# Patient Record
Sex: FEMALE | Race: WHITE
[De-identification: names, ages, dates, MRNs, and addresses within clinical notes are randomized per-mention and may not be internally consistent; named-entity substitution may affect disease eponyms.]

---

## 2017-05-03 NOTE — QN
Documentation


Comment


31-year-old  with IUP at 39 weeks and 1 day with history of  1 

and unstable lie, she is here today for NST due to GDM A1.  Past GYN history 

significant for history of bicornuate uterus.  She also had a history of C-

section 1 due to breech presentation.  Baby's presentation and current 

pregnancy is variable and had unstable lie.  Last ultrasound confirmed vertex 

presentation.  Patient denies any leaking of fluid, vaginal bleeding, decreased 

fetal movement, contractions or any other complaint.  Patient reports that she 

has been compliant with taking her blood sugar.  She shows me her blood sugar 

log.  After review noted fasting blood sugar and postprandial in goal range.  

She checks fasting and 2 hour postprandial.





Physical examination: General appearance: Alert and oriented 4 patient does 

not appear to be in any acute distress.





Abdomen: Soft, gravid, nontender,


Fundal height consistent with gestational age.


NST: Category 1, there was a single episode of early deceleration with a 

variable deceleration 1 that resolved and did not recur during monitoring


BPP: 





PROCEDURE:   OB ultrasound for biophysical profile 


 


CLINICAL INDICATION:   Presentation


 


TECHNIQUE:   Multiple sonographic images of the pelvis were obtained.  

Transabdominal views of the gravid uterus are available for review.  The images 

were reviewed on a PACS workstation.  


 


COMPARISON:   None 


 


FINDINGS:


 


Fetal breathing movement = 2/2


Fetal tone = 2/2


Fetal motion = 2/2


FRANCISCO = 2/2


 


FRANCISCO = 8.4 cm


Single live intrauterine pregnancy.  Fetal position is cephalic.  The placenta 

is anterior.


 


IMPRESSION:


 


1.  Single live intrauterine gestation.  


2.  Biophysical profile = /8.


3.  FRANCISCO = 8.4 cm. 


 








Assessment:


IUP at 39 weeks and 1 day


History of  1


GDM A1, well controlled


History of  1 due to breech, desires T0 LAC


Bicornuate uterus


Baby's presentation currently vertex  testing reassuring.


Patient will be discharged home.





Will return to anesthetic clinic in 2 days for repeat NST


Strict labor precaution and fetal kick count discussed


She is currently scheduled for  at 40 weeks if she does not deliver by 

then


She verbalized understanding plan of care and all above instructions.











IVAN MITCHELL MD May 3, 2017 16:57

## 2017-05-03 NOTE — RADRPT
PROCEDURE:   OB ultrasound for biophysical profile 

 

CLINICAL INDICATION:   Presentation

 

TECHNIQUE:   Multiple sonographic images of the pelvis were obtained.  Transabdominal views of the g
ravid uterus are available for review.  The images were reviewed on a PACS workstation.  

 

COMPARISON:   None 

 

FINDINGS:

 

Fetal breathing movement = 2/2

Fetal tone = 2/2

Fetal motion = 2/2

FRANCISCO = 2/2

 

FRANCISCO = 8.4 cm

Single live intrauterine pregnancy.  Fetal position is cephalic.  The placenta is anterior.

 

IMPRESSION:

 

1.  Single live intrauterine gestation.  

2.  Biophysical profile = 8/8.

3.  FRANCISCO = 8.4 cm. 

 

 

RPTAT: HH

_____________________________________________ 

.Blanca Whittaker MD, MD           Date    Time 

Electronically viewed and signed by .Blanca Whittaker MD, MD on 05/03/2017 12:10 

 

D:  05/03/2017 12:10  T:  05/03/2017 12:10

.G/

## 2017-05-08 NOTE — OPR
Operative Report


Planned Procedure


Procedure date


May 8, 2017


Procedure(s)


repeat C/S


Performed by:  DALTON CLIFTON MD


Assisting provider:  SANDRITA CHAN MD


Anesthesiologist:  JAEL LEMUS MD


Pre-procedure diagnosis


term gestation


previous C/S 


labor pains








Anesthesia Type:   spinal











Procedure Description


Under satisfactory anaesthesia a Pfannenstiel incision was made two 

fingerbreadth above and parallel to the symphysis of pubis around the previous 

scar and previous scar was removed 


Incision was extended laterally to the border of the Recti muscles on either 

sides. Incision was carried down with sharp and blunt dissection until fascia 

was reached. Anterior Recti muscle fascia was incised in mid portion and 

incision extended laterally to the border of skin incision. Fascia was 

mobilized from muscle superiorly and Recti muscles were  from midline 

using sharp and blunt dissection.


Peritoneum was visualized; Avoiding bowel and bladder it was incised . Incision 

was extended superiorly and inferiorly. Bladder blade was placed. Posterior 

peritoneum covering the lower segment of the uterus and lower segment of the 

uterus were incised.Low transverse uterine incision was made on lower segment 

of the uterus. Incision extended laterally to the border of Round Lig. on 

either sides and baby was delivered from  OT.  position . Amniotic fluid 

appeared clear. Cord blood was obtained and cord had 3 vessels . 


Placenta was delivered spontaneously and appeared intact and complete.


Intrauterine cavity was rubbed with a laparotomy sponge. Uterine incision was 

closed in 2 layers using running stitches of No1 Monocryl. Hemostasis appeared 

secure. Ovaries and Fallopian tubes were within normal limits.


Announcing needle, lap sponge and instrument count to be correct abdomen was 

closed in layers as follows:


Peritoneum and Recti muscles with running stitches of 20 Vicryl. Fascia with 

running stitch of No 1 PDS. Subcutaneous tissue with running stitches of 20 

Chromic and skin was closed using staples. 


Patient tolerated the procedure well and was transferred to [facility] in good 

condition.





Post-Procedure


Post-procedure diagnosis


S/P C/S 


bicornuate uterus


Findings:


Live Baby


Bicornuate uterus


Specimen removed:  No


Complications:  None


Pt Condition post procedure:  stable


Disposition:  PACU





Physician Certification


I, the undersigned physician, hereby certify that I have discussed the 

procedure described in this consent form with this patient (or the patient's 

legal representative), including:


* The risk and benefits of the procedure;


* Any adverse reactions that may reasonably be expected to occur;


* Any alternative efficacious methods of treatment which may be medically viable

;


* The potential problems that may occur during recuperation;


* Potential for blood transfusion and associated risks/benefits; and


* Any research or economic interest I may have regarding this treatment.


I further certify that the patient/legally responsible person was encouraged to 

ask question and that all questions were answered.











DALTON CLIFTON MD May 8, 2017 19:24

## 2017-05-08 NOTE — RADRPT
PROCEDURE:   Limited OB ultrasound 

 

CLINICAL INDICATION:   Fetal position

 

TECHNIQUE:   Sonographic evaluation to assess the fetal position was performed.  Transabdominal imag
ing of the gravid uterus was performed. 

 

COMPARISON:   No prior exam is available for comparison.  

 

FINDINGS:

 

There is a single live intrauterine pregnancy with a fetal heart rate of 159 bpm.  Fetal position is
 cephalic.  The placenta is anterior.  

 

IMPRESSION:

 

Cephalic presentation.

 

 

 

RPTAT: HH

_____________________________________________ 

.Blanca Whittaker MD, MD           Date    Time 

Electronically viewed and signed by .Blanca Whittaker MD, MD on 05/08/2017 10:10 

 

D:  05/08/2017 10:10  T:  05/08/2017 10:10

.G/

## 2017-05-08 NOTE — HP
Date/Time of Note


Date/Time of Note


DATE: 17 


TIME: 19:15





OB - History


Hx of Present Pregnancy


Free Text/Dictation


admitted at 39 + weeks C/S DROm [id number] AM with Hx of previous C/S X 1


Chief Complaint:  SROM and labor pains


Last Menstrual Period:  2016


Estimated Due Date:  May 9, 2017


:  3


Para:  2


Prenatal Care:  Good Care


Ultrasounds:  Normal mid trimester US


Obstetrical Complications:  Gestational Diabetes, Other (Bicornuate uterus seen 

)


Medical Complications:  Other (previous C/S X 1 )





Past Family/Social History


*


Past Medical, Surgical, Family and Obstetric Histories reviewed from prenatal 

chart.


Blood Type:  O+


Rubella:  immune


RPR/VDRL:  Negative


GBS Status:  Unknown


HBsAG:  Negative





OB  Admission Exam


Vital Signs


Vital Signs





 Vital Signs








  Date Time  Temp Pulse Resp B/P Pulse Ox O2 Delivery O2 Flow Rate FiO2


 


17 09:34 98.4 81 18 137/86  Room Air  











Physical Exam


HEENT:  WNL


Heart:  Rhythm Normal


Lungs:  Clear, Equal


Abdomen:  WNL


Extremities:  Normal


Reflexes:  Normal


Cervical Dilatation:  1cm


Effacement:  50%


Station:  -3


Membranes:  Intact


Fetal Heart Rate:  130's


Accelerations:  Accelerations Present


Decelerations:  Variable Decelerations


Varibility:  Moderate


Contractions on Admission:  6-10 Minutes Apart


Date/Time Contractions Began:  2017 0500 am


Frequency of Contractions:  Q 5-7


Duration:  30 SECONDS


Intensity:  Mild


Last 72 hours Lab Results


 CBC & BMP


17 10:45








17 15:30











OB  Assessment/Plan


Other Assessment:


TERM GESTATION


PREVIOUS C/S x 1 


SROM


Other plan:


after of variable deceleration of FHTs decision was made by the patient to have 

repeat C/S 


Patient had good awareness of nature and complications of C/S procedure 

including but but limited to infection and hemorrhage.and agreed to undergo C/S 

+.











DALTON CLIFTON MD May 8, 2017 19:22

## 2017-05-09 NOTE — PN
Date/Time of Note


Date/Time of Note


DATE: 5/9/17 


TIME: 14:58





Assessment/Plan


VTE Prophylaxis


VTE Prophylaxis Intervention:  ambulation





Lines/Catheters


IV Catheter Type (from Nrsg):  Peripheral IV





Assessment/Plan


Assessment/Plan


POD # 1


S/P C/S 


Will advance diet and ambulate





Subjective


24 Hr Interval Summary


No BM


passing flatus


Constitutional:  BM, ambulates, flatus, improved, no complaints, urine output


Pain Control:  well controlled





Exam/Review of Systems


Vital Signs


Vitals





 Vital Signs








  Date Time  Temp Pulse Resp B/P Pulse Ox O2 Delivery O2 Flow Rate FiO2


 


5/9/17 12:00     97   21


 


5/9/17 12:00 98.4 67 16 110/64  Room Air  














 Intake and Output   


 


 5/8/17 5/8/17 5/9/17





 15:00 23:00 07:00


 


Intake Total 325 ml 1075 ml 1025 ml


 


Output Total 350 ml 1200 ml 900 ml


 


Balance -25 ml -125 ml 125 ml











Exam


Free Text/Dictation


Abdomen: soft BS +


incision : covered


Constitutional:  alert, oriented, well developed


Psych:  nl mood/affect, no complaints


Head:  atraumatic, normocephalic


Eyes:  EOMI, nl conjunctiva, nl lids, nl sclera


ENMT:  mucosa pink and moist, nl external ears & nose, nl lips & teeth, nl 

nasal mucosa & septum


Neck:  non-tender, supple


Respiratory:  clear to auscultation, normal air movement


Cardiovascular:  nl pulses, regular rate and rhythm


Gastrointestinal:  nl liver, spleen, non-tender, soft


Musculoskeletal:  nl extremities to inspection, nl gait and stance


Extremities:  normal pulses


Neurological:  CNS II-XII intact, nl mental status, nl speech, nl strength


Skin:  nl turgor, rash or lesions


Lymph:  nl lymph nodes





Results


Result Diagram:  


5/9/17 0727                                                                    

            5/8/17 1530














DALTON CLIFTON MD May 9, 2017 14:59

## 2017-05-10 NOTE — PD.PPDC
OB/GYN Discharge Instruction


Provider Information


Physician Information


32 y/o female had repeat C/S





Diagnosis


Final Diagnosis:  S/P C/S





Condition


Patient Condition:  Good





Diet


Diet:  Special Diet


Special Diet:


2000 khushbu ADA





Activity/Restrictions








Activity:   May Shower














Restrictions:   No Exercising





 No Lifting





 Nothing in the Vagina





Return to Work or School:  Jul 10, 2017





Wound/Drain Care Instructions








Wound/Drain Care Instructions:   Keep clean and dry











Follow-up


Follow-up with Physician:  3, 4, Day/Days (in clinic for staple removal )





Return to clinic for








GYN Instructions:   Fever greater than 101





 Chills














OB Instructions:   Breast Tenderness





 Depression














Surgical Instructions:   Incisional Drainage





 Incisional Redness

















DALTON CLIFTON MD May 10, 2017 17:56

## 2017-05-10 NOTE — DS
Date/Time of Note


Date/Time of Note


DATE: 5/10/17 


TIME: 17:52





Discharge Summary


Admission/Discharge Info


Admit Date/Time


May 8, 2017 at 10:49


Discharge Date/Time


5/11/2017


Final Diagnosis


S/P C/S


Patient Condition:  Good


Procedures


repeat C/S


Hx of Present Illness


32 y/o female had repeat C/S


Hospital Course


Uncomplicated


Home Meds


Reported Medications


Multivit/Min/Fol Ac/Iron/Pren* (Prenatal S*) 1 Tab Tab, 1 TAB PO DAILY, TAB


   5/8/17


Discontinued Scripts


Cephalexin* (Keflex*) 500 Mg Capsule, 500 MG PO QID for 7 Days, CAP


   Prov:DON JAIN PA-C         9/19/16


Ondansetron Hcl* (Zofran*) 4 Mg Tab, 4 MG PO Q6H Y for NAUSEA AND OR VOMITING, #

20 TAB


   Prov:MIGUEL NUÑEZ         7/11/15


Follow-up Plan


3-4 days in clinic for staple removal


Pending Labs





Laboratory Tests








Test


  5/9/17


20:13 5/10/17


07:15 5/10/17


08:39 5/10/17


11:36


 


Bedside Glucose


  117mg/dL


() 


  76mg/dL


() 112mg/dL


()


 


White Blood Count


  


  11.810^3/ul


(4.8-10.8) 


  


 


 


Red Blood Count


  


  3.5510^6/ul


(4.20-5.40) 


  


 


 


Hemoglobin


  


  9.3g/dl


(12.0-16.0) 


  


 


 


Hematocrit


  


  30.1%


(37.0-47.0) 


  


 


 


Mean Corpuscular Volume


  


  84.8fl


(82.0-101.0) 


  


 


 


Mean Corpuscular Hemoglobin


  


  26.2pg


(29.0-33.0) 


  


 


 


Mean Corpuscular Hemoglobin


Concent 


  30.9g/dl


(32.0-37.0) 


  


 


 


Red Cell Distribution Width


  


  15.0%


(11.5-14.5) 


  


 


 


Platelet Count


  


  83029^3/UL


(140-415) 


  


 


 


Mean Platelet Volume


  


  11.2fl


(7.4-10.4) 


  


 


 


Neutrophils %


  


  78.9%


(39.0-77.0) 


  


 


 


Lymphocytes %


  


  14.5%


(15.0-51.0) 


  


 


 


Monocytes %


  


  5.8%


(0.0-11.0) 


  


 


 


Eosinophils %  0.3% (0.0-7.0)   


 


Basophils %  0.2% (0.0-2.0)   


 


Nucleated Red Blood Cells %


  


  0.0/100WBC


(0.0-0.0) 


  


 


 


Neutrophils #


  


  9.310^3/ul


(1.6-7.5) 


  


 


 


Lymphocytes #


  


  1.710^3/ul


(0.8-2.9) 


  


 


 


Monocytes #


  


  0.710^3/ul


(0.3-0.9) 


  


 


 


Eosinophils #


  


  0.010^3/ul


(0.0-0.5) 


  


 


 


Basophils #


  


  0.010^3/ul


(0.0-0.1) 


  


 


 


Nucleated Red Blood Cells #


  


  0.010^3/ul


(0.0-0.0) 


  


 














Test


  5/10/17


15:58 


  


  


 


 


Bedside Glucose


  105mg/dL


() 


  


  


 

















DALTON CLIFTON MD May 10, 2017 17:54

## 2017-05-10 NOTE — DS
Date/Time of Note


Date/Time of Note


home next day 


DATE: 5/10/17 


TIME: 17:49





Obstetrical Discharge Record


Final Diagnosis


Final Diagnosis:  Term delivered


Other Final Diagnosis


S/P C/S





 Section


 Section:  Repeat





Complications


Gestational Diabetes





Condition on Discharge


Physical Assessment


Last Vitals:


see nurses notes


Voiding:  Yes


Bowel Movement:  Yes


Breast:  Soft, non-tender, Filling


Fundus:  Firm


Abdomen and Incision:


soft BS+


INCISION : HEALING


Episiotomy:


NA


Calf Tenderness:  No


Patient Condition:  Good











DALTON CLIFTON MD May 10, 2017 17:52

## 2017-08-15 NOTE — RADRPT
PROCEDURE:   Right upper quadrant ultrasound 

 

CLINICAL INDICATION:   Abdominal pain 

 

TECHNIQUE:   Multiple real-time images were acquired of the patient's abdomen and right retroperiton
eum utilizing a high resolution transducer. 

 

COMPARISON:   None 

 

FINDINGS:

The liver is normal in echogenicity and measures 13.03 cm.  No focal hepatic masses are seen.  The g
allbladder is physiologically distended.  There is no evidence of gallstones, gallbladder wall thick
ening, or pericholecystic fluid.  The intra and extrahepatic bile ducts are normal in caliber.  The 
common bile duct measures 2.32 mm.

 

Midline images demonstrate the pancreas to be normal in echogenicity without obvious inflammatory ch
kody.

 

Survey views of the right kidney demonstrate no evidence of hydronephrosis or renal calculi.  The ri
ght kidney measures 9.79 cm.  

 

IMPRESSION:

Unremarkable right upper quadrant ultrasound.  No evidence of cholelithiasis or acute cholecystitis.
. 

 

 

RPTAT: HH

_____________________________________________ 

.Ady Pang MD, MD           Date    Time 

Electronically viewed and signed by .Ady Pang MD, MD on 08/15/2017 17:09 

 

D:  08/15/2017 17:09  T:  08/15/2017 17:09

.W/

## 2017-08-15 NOTE — ERD
ER Documentation


Chief Complaint


Date/Time


DATE: 8/15/17 


TIME: 17:51


Chief Complaint


pt bib self with c/o abd pain and nausea for a few days 3 months post partu





HPI


Patient is a 31-year-old female with a past medical history of an appendectomy 

who presents to the emergency department with concerns of diffuse abdominal 

pain nausea 3 days.  Patient describes the pain to be episodic in nature.  

Patient describes the pain to be crampy.  Patient states she started having 

diarrhea today.  Patient reports 4 episodes of nonbloody nonmucous stools 

today.  Patient states she feels nauseous however she denies any vomiting.  

Patient reports tactile fevers.  Patient did not take her temperature with a 

thermometer.  Patient denies taking any antipyretics.  Patient denies any flank 

pain, dysuria, frequency, urgency or hematuria.  Patient denies any chest pain, 

shortness breath, left upper extremity pain, diaphoresis or LOC.  Patient did 

recently travel to [facility].  No sick contacts.  No recent travel.





ROS


All systems reviewed and are negative except as per history of present illness.





Medications


Home Meds


Active Scripts


Nitrofurantoin Monohyd Macrocr* (Macrobid*) 100 Mg Capsr, 100 MG PO HS for 5 

Days, CAP


   Prov:GABE STANFORD          17


Loperamide Hcl* (Imodium*) 2 Mg Capsule, 2 MG PO BID Y for DIARRHEA, #10 CAP


   MAX 16 mg/day


   Prov:GABE STANFORD          17


Metoclopramide* (Reglan*) 10 Mg Tablet, 10 MG PO Q6 Y for NAUSEA AND/OR VOMITING

, #10 TAB


   Prov:ELAINA HERNÁNDEZ PA-C         8/15/17


Cephalexin* (Keflex*) 500 Mg Capsule, 500 MG PO TID for 7 Days, CAP


   Prov:ELAINA HERNÁNDEZ PA-C         8/15/17


Ibuprofen* (Motrin*) 600 Mg Tab, 600 MG PO Q6, #30 TAB


   Prov:ELAINA HERNÁNDEZ PA-C         8/15/17


[Oxycodone/Acetamin (5/325)] 1 TAB TAB No Conflict Check, 2 TAB PO Q4H Y for 

PAIN LEVEL 6-10, #30 0 Refills


   Prov:DALTON CLIFTON MD         5/10/17


Ibuprofen* (Ibuprofen*) 800 Mg Tablet, 800 MG PO Q8, #30 TAB 0 Refills


   Prov:DALTON CLIFTON MD         5/10/17


Metformin* (Glucophage* XR) 500 Mg Tab.sr.24h, 500 MG PO BID WITH MEALS, #120 6 

Refills


   Prov:DALTON CLIFTON MD         5/10/17


Reported Medications


Multivit/Min/Fol Ac/Iron/Pren* (Prenatal S*) 1 Tab Tab, 1 TAB PO DAILY, TAB


   17





Allergies


Allergies:  


Coded Allergies:  


     No Known Drug Allergy (Verified  Allergy, Unknown, 17)





PMhx/Soc


History of Surgery:  Yes (appendectomy)


Anesthesia Reaction:  No


Hx Neurological Disorder:  No


Hx Respiratory Disorders:  No


Hx Cardiac Disorders:  No


Hx Psychiatric Problems:  No


Hx Miscellaneous Medical Probl:  No


Hx Alcohol Use:  No


Hx Substance Use:  No


Hx Tobacco Use:  No


Smoking Status:  Never smoker





Physical Exam


Vitals





Vital Signs








  Date Time  Temp Pulse Resp B/P Pulse Ox O2 Delivery O2 Flow Rate FiO2


 


8/15/17 15:45 100.4 122 18 124/74 99   








Physical Exam


GENERAL: Well-developed, well-nourished female. Appears in no acute distress. 


HEAD: Normocephalic, atraumatic. 


EYES: Pupils are equally reactive bilaterally. EOMs grossly intact. No 

conjunctival erythema. 


ENT: Moist mucous membranes. No uvula deviation. No kissing tonsils. 


NECK: Supple. No meningismus. Normal range of motion of the neck.


LUNG: Clear to auscultation bilaterally. No rhonchi, wheezing, rales or coarse 

breath sounds. 


HEART: Regular rate and rhythm. No murmurs, rubs or gallops.


ABDOMEN:  Soft and nondistended.  Minimally tender to palpation in all 4 

quadrants.  Positive bowel sounds in all four quadrants. No rebound tenderness, 

no guarding. (-) McBurney's point tenderness. No CVA tenderness bilaterally.


EXTREMITIES: Equal pulses bilaterally. No peripheral clubbing, cyanosis or 

edema. No unilateral leg swelling.


NEUROLOGIC: Alert and oriented. Moving all four extremities without any 

difficulty. Normal speech. Steady gait. 


SKIN: Normal color. Warm and dry. No rashes or lesions.


Result Diagram:  


8/15/17 1628                                                                   

             8/15/17 1628





Results 24 hrs





 Laboratory Tests








Test


  8/15/17


16:18 8/15/17


16:28


 


Urine Color YELLOW  


 


Urine Clarity CLOUDY  


 


Urine pH 5.0  


 


Urine Specific Gravity 1.005  


 


Urine Ketones NEGATIVEmg/dL  


 


Urine Nitrite NEGATIVEmg/dL  


 


Urine Bilirubin NEGATIVEmg/dL  


 


Urine Urobilinogen NEGATIVEmg/dL  


 


Urine Leukocyte Esterase 3+Nina/ul  


 


Urine Microscopic RBC 33/HPF  


 


Urine Microscopic WBC 85/HPF  


 


Urine Squamous Epithelial


Cells MODERATE/HPF 


  


 


 


Urine Renal Epithelial Cells FEW/HPF  


 


Urine Bacteria FEW/HPF  


 


Urine Hemoglobin 3+mg/dL  


 


Urine Glucose NEGATIVEmg/dL  


 


Urine Total Protein NEGATIVEmg/dl  


 


White Blood Count  13.510^3/ul 


 


Red Blood Count  4.8410^6/ul 


 


Hemoglobin  13.2g/dl 


 


Hematocrit  40.2% 


 


Mean Corpuscular Volume  83.1fl 


 


Mean Corpuscular Hemoglobin  27.3pg 


 


Mean Corpuscular Hemoglobin


Concent 


  32.8g/dl 


 


 


Red Cell Distribution Width  14.7% 


 


Platelet Count  31211^3/UL 


 


Mean Platelet Volume  9.9fl 


 


Neutrophils %  83.5% 


 


Lymphocytes %  10.7% 


 


Monocytes %  5.2% 


 


Eosinophils %  0.2% 


 


Basophils %  0.1% 


 


Nucleated Red Blood Cells %  0.0/100WBC 


 


Neutrophils # (Manual)  11.210^3/ul 


 


Lymphocytes #  1.410^3/ul 


 


Monocytes #  0.710^3/ul 


 


Eosinophils #  0.010^3/ul 


 


Basophils #  0.010^3/ul 


 


Nucleated Red Blood Cells #  0.010^3/ul 


 


Sodium Level  138mmol/L 


 


Potassium Level  3.3mmol/L 


 


Chloride Level  103mmol/L 


 


Carbon Dioxide Level  24mmol/L 


 


Anion Gap  14 


 


Blood Urea Nitrogen  12mg/dl 


 


Creatinine  0.56mg/dl 


 


Glucose Level  92mg/dl 


 


Calcium Level  9.2mg/dl 


 


Total Bilirubin  0.4mg/dl 


 


Direct Bilirubin  0.00mg/dl 


 


Indirect Bilirubin  0.4mg/dl 


 


Aspartate Amino Transf


(AST/SGOT) 


  33IU/L 


 


 


Alanine Aminotransferase


(ALT/SGPT) 


  57IU/L 


 


 


Alkaline Phosphatase  121IU/L 


 


Total Protein  8.1g/dl 


 


Albumin  4.7g/dl 


 


Globulin  3.40g/dl 


 


Albumin/Globulin Ratio  1.38 


 


Lipase  107U/L 








 Current Medications








 Medications


  (Trade)  Dose


 Ordered  Sig/Aidee


 Route


 PRN Reason  Start Time


 Stop Time Status Last Admin


Dose Admin


 


 Sodium Chloride


  (NS)  1,000 ml @ 


 1,000 mls/hr  Q1H STAT


 IV


   8/15/17 16:14


 8/15/17 17:13 DC 8/15/17 16:59


 


 


 Ketorolac


 Tromethamine


  (Toradol)  30 mg  ONCE  STAT


 IV


   8/15/17 16:14


 8/15/17 16:17 DC 8/15/17 17:06


 


 


 Metoclopramide


 HCl 10 mg  10 mg  ONCE  ONCE


 IV


   8/15/17 16:30


 8/15/17 16:31 DC 8/15/17 17:04


 


 


 Ceftriaxone Sodium


  (Rocephin)  50 ml @ 


 100 mls/hr  ONCE  ONCE


 IVPB


   8/15/17 18:00


 8/15/17 18:29 DC 8/15/17 17:56


 











Procedures/MDM


ED COURSE:


The patient was stable throughout ED course. I kept the patient and/or family 

informed of laboratory and diagnostic imaging results throughout the ED course.

  





DIAGNOSTIC IMAGING:


Read by radiologist.





 Patient: AYAN CARDENAS   : 1985   Age: 31  Sex: F            

            


 MR #:    [id number]   Acct #:   [id number]    DOS: 08/15/17 0000


 Ordering MD: ELAINA HERNÁNDEZ PA-C   Location:  [facility]   Room/Bed:                 

                           


 








PROCEDURE:   Right upper quadrant ultrasound 


 


CLINICAL INDICATION:   Abdominal pain 


 


TECHNIQUE:   Multiple real-time images were acquired of the patient's abdomen 

and right retroperitoneum utilizing a high resolution transducer. 


 


COMPARISON:   None 


 


FINDINGS:


The liver is normal in echogenicity and measures 13.03 cm.  No focal hepatic 

masses are seen.  The gallbladder is physiologically distended.  There is no 

evidence of gallstones, gallbladder wall thickening, or pericholecystic fluid.  

The intra and extrahepatic bile ducts are normal in caliber.  The common bile 

duct measures 2.32 mm.


 


Midline images demonstrate the pancreas to be normal in echogenicity without 

obvious inflammatory change.


 


Survey views of the right kidney demonstrate no evidence of hydronephrosis or 

renal calculi.  The right kidney measures 9.79 cm.  


 


IMPRESSION:


Unremarkable right upper quadrant ultrasound.  No evidence of cholelithiasis or 

acute cholecystitis.. 


 


 


RPTAT: HH


_____________________________________________ 


.Ady Pang MD, MD           Date    Time 


Electronically viewed and signed by .Ady Pang MD, MD on 08/15/2017 17:09 


 


D:  08/15/2017 17:09  T:  08/15/2017 17:09


.W/





CC: ELAINA HERNÁNDEZ PA-C








PROCEDURES:


None.





MEDICATIONS GIVEN: 


IV fluids, Toradol, Zofran, Rocephin 1 g


Patient tolerated medication well with no adverse reactions. Patient reported 

improvement in pain. 











MEDICAL DECISION MAKING: 


This is a 31-year-old female with past medical history of an appendectomy 

presents emergency department for complaints of diffuse abdominal pain, nausea, 

and diarrhea.  Vital signs were reviewed. Patient was febrile at initial 

presentation with a temperature 100.4F.  Patient's pulse was noted to be 122.  

She was given Toradol here in the emergency department.  Patient's vitals were 

improved prior to discharge.  Patient was no longer afebrile and was no longer 

tachycardic.  Abdominal exam revealed diffuse tenderness in all 4 quadrants.  

Patient had no peritoneal signs.  Patient no guarding or rebound.  CBC did show 

an elevated white count of 13.5.  No evidence of severe anemia.  Patient's 

potassium was noted to be 3.3.  Patient was advised to increase potassium 

intake over the next 2 days and eat two bananas per day.  CBC showed no other 

electrolyte abnormalities, severe acidosis, alkalosis, renal failure liver 

disease.  The patient no evidence of acute pancreatitis.  Urinalysis did show 3

+ leukocyte esterase, 85 WBCs, 3+ hemoglobin.  Pregnancy test was negative.  

Right upper quadrant ultrasound was unremarkable.  Patient was given Rocephin 

IV here in the emergency department.  Patient will be discharged home with 

prescription for Keflex.  At this time patient's presentation is most 

consistent with diffuse abdominal pain, diarrhea and UTI.  Low suspicion for AAA

, bowel obstruction, DKA, bowel perforation, cholelithiasis, cholecystitis, 

choledocholithiasis, pancreatitis, nephrolithiasis, appendicitis, constipation, 

pregnancy, ectopic pregnancy, PID, ovarian torsion or tubo-ovarian abscess.  I 

doubt sepsis at this time. Patient was non-toxic, non-ill appearing prior to 

discharge. 





PRESCRIPTIONS: 


Ibuprofen, Zofran, Keflex 








DISCHARGE:


At this time, patient is stable for discharge and outpatient management.  She 

was given a copy of all imaging studies and blood work obtained today.  I have 

instructed the patient to follow-up with his/her primary care physician in 1-2 

days. I have instructed the patient to promptly return to the ER at any time 

for any new or worsening symptoms including increased pain, nausea, vomiting, 

diarrhea, fever, weakness or LOC. The patient and/or family expressed 

understanding of and agreement with this plan. All questions were answered. 

Home care instructions were provided. 





Disclaimer: Inadvertent spelling and grammatical errors are likely due to EHR/

dictation software use and do not reflect on the overall quality of patient 

care. Also, please note that the electronic time recorded on this note does not 

necessarily reflect the actual time of the patient encounter.





Departure


Diagnosis:  


 Primary Impression:  


 UTI (urinary tract infection)


 Urinary tract infection type:  site unspecified  Hematuria presence:  with 

hematuria  Qualified Code:  N39.0 - Urinary tract infection with hematuria, 

site unspecified


 Additional Impressions:  


 Diarrhea


 Diarrhea type:  unspecified type  Qualified Code:  R19.7 - Diarrhea, 

unspecified type


 Abdominal pain


 Abdominal location:  unspecified location  Qualified Code:  R10.9 - Abdominal 

pain, unspecified location


Condition:  Stable


Patient Instructions:  Treating Diarrhea, Understanding Urinary Tract 

Infections (UTIs)





Additional Instructions:  


Call your primary care doctor TOMORROW for an appointment during the next 1-2 

days.See the doctor sooner or return here if your condition worsens before your 

appointment time.





Drink plenty of fluids.  Take medication as needed.  Fever control advised.











ELAINA HERNÁNDEZ PA-C Aug 15, 2017 17:58

## 2017-08-17 NOTE — ERD
ER Documentation


Chief Complaint


Date/Time


DATE: 17 


TIME: 22:21


Chief Complaint


pelvic pain, diarrhea, fever





HPI


31-year-old female presents emergency department for pelvic pain, diarrhea, 

fever.  Stated that she was here last Tuesday and was discharged with a final 

diagnosis of UTI.  Complaints of abdominal pain that started last night.  Had a 

 3 months ago. 


Denies headache, loss of consciousness, dizziness, blurry vision, changes in 

vision, photophobia, facial pain, ear pain, throat pain, difficulty swallowing, 

neck pain, shoulder pain, chest pain, cough, hemoptysis, back pain, loss of 

appetite, nausea, vomiting, hematochezia, diarrhea, constipation, pregnancy, 

the possibility of being pregnant, bladder and bowel incontinences, extremity 

weakness, extremity tenderness, numbness or tingling sensation, difficulty 

walking, recent travel, recent exposure to illness. 





LMP: Patient stated that she does not have a menstrual period this time.  Still 

breast-feeding.   A0.


No known drug allergies.


No past medical history.


Surgical history: Appendectomy.


Medication: Denies.


Social: Not working at this time.


Denies smoking, use of alcohol, use of illegal drugs.





ROS


All systems reviewed and are negative except as per history of present illness.





Medications


Home Meds


Active Scripts


Acetaminophen* (Tylophen*) 500 Mg Capsule, 1 CAP PO Q6H Y for PAIN AND OR 

ELEVATED TEMP, #20 CAP


   Prov:PAULA CORADO         17


Amoxicillin/Potassium Clav (Amox-Clav 875-125 mg Tablet) 875-125 mg Tab, 1 TAB 

PO BID for 10 Days, #20 TAB


   Prov:DALLASMIGUELINAMICHELLENUZHAT DUPREE         17


Nitrofurantoin Monohyd Macrocr* (Macrobid*) 100 Mg Capsr, 100 MG PO HS for 5 

Days, CAP


   Prov:GABE STANFORD DO         17


Loperamide Hcl* (Imodium*) 2 Mg Capsule, 2 MG PO BID Y for DIARRHEA, #10 CAP


   MAX 16 mg/day


   Prov:GABE STANFORD DO         17


Metoclopramide* (Reglan*) 10 Mg Tablet, 10 MG PO Q6 Y for NAUSEA AND/OR VOMITING

, #10 TAB


   Prov:ELAINA HERNÁNDEZ PA-C         8/15/17


Cephalexin* (Keflex*) 500 Mg Capsule, 500 MG PO TID for 7 Days, CAP


   Prov:ELAINA HERNÁNDEZ PA-C         8/15/17


Ibuprofen* (Motrin*) 600 Mg Tab, 600 MG PO Q6, #30 TAB


   Prov:ELAINA HERNÁNDEZ PA-C         8/15/17


[Oxycodone/Acetamin (5/325)] 1 TAB TAB No Conflict Check, 2 TAB PO Q4H Y for 

PAIN LEVEL 6-10, #30 0 Refills


   Prov:DALTON CLIFTON MD         5/10/17


Ibuprofen* (Ibuprofen*) 800 Mg Tablet, 800 MG PO Q8, #30 TAB 0 Refills


   Prov:DALTON CLIFTON MD         5/10/17


Metformin* (Glucophage* XR) 500 Mg Tab.sr.24h, 500 MG PO BID WITH MEALS, #120 6 

Refills


   Prov:DALTON CLIFTON MD         5/10/17


Reported Medications


Multivit/Min/Fol Ac/Iron/Pren* (Prenatal S*) 1 Tab Tab, 1 TAB PO DAILY, TAB


   17





Allergies


Allergies:  


Coded Allergies:  


     No Known Drug Allergy (Verified  Allergy, Unknown, 17)





PMhx/Soc


History of Surgery:  Yes (appendectomy (2015))


Anesthesia Reaction:  No


Hx Neurological Disorder:  No


Hx Respiratory Disorders:  No


Hx Cardiac Disorders:  No


Hx Psychiatric Problems:  No


Hx Miscellaneous Medical Probl:  No


Hx Alcohol Use:  No


Hx Substance Use:  No


Hx Tobacco Use:  No





Physical Exam


Vitals





Vital Signs








  Date Time  Temp Pulse Resp B/P Pulse Ox O2 Delivery O2 Flow Rate FiO2


 


17 02:54 99.1 96 15 113/58 97 Room Air  


 


17 01:55 98.0 108 18 116/57    


 


17 21:58 102.7 128 20 113/70 99   








Physical Exam


CONSTITUTIONAL: Well-appearing; well-nourished; in no apparent distress.  


HEAD: Normocephalic; atraumatic.  


EYES: Conjunctiva clear, sclera non-icteric, EOM intact. PERRL 


Ears: Hearing intact. EACs clear, TMs non-bulging, non-inflamed, translucent & 

mobile, ossicles normal appearance, No obstructions, no erythema, no discharges


Nose: No obstructions. No polyps. No external lesions. Mucosa non-inflamed. No 

external lesions, septum and turbinates normal. No rhinorrhea. No discharges. 

Frontal sinus is non-tender to palpation. Maxillary sinus is non-tender to 

palpation. 


MOUTH: Moist mucous membranes, no lesion, no obstructions, no vesicles, no 

thrush, patent airway


Throat: Uvula in midline. Right tonsil is +1 with no erythema, no exudate. Left 

tonsil is +1 with no erythema, no exudate. Tolerating secretions well. Good gag 

reflex. Patent airway.


Neck: Supple, without lesions, bruits, or adenopathy. No mass. Thyroid non-

enlarged and non-tender to palpation.


CHEST: Symmetrical chest. Respirations even and not labored. No retractions 

noted.


CARDIOVASCULAR: Normal S1, S2. RRR. No murmurs, gallops.


RESPIRATORY: Normal chest excursion with respiration; breath sounds clear and 

equal bilaterally; no wheezes, rhonchi, or rales.  Breathing even and 

unlabored. Speaking in clear, full, and complete sentences w/ ease. 


ABDOMEN: Normal bowel sounds normal. Soft, round, non-distended, non-guarding, 

no rebound, no organomegaly, no masses, no pulsating abdominal mass.  Has 

diffuse abdominal tenderness.  No hernia. No peritoneal signs.   site 

is healed.  No dehiscence.  No bleeding.  No discharge.  Patient developed 

abdominal pain after jumping twice.


: Has mild CVA tenderness bilaterally.


BACK: Symmetrical shoulder. Spine is midline without deformity, tenderness. No 

evidence of trauma or deformity.


PELVIS: Stable pelvis. No evidence of trauma or deformity.  


MUSCULOSKELETAL: Normal gait and station. No misalignment, asymmetry, 

crepitation, defects, tenderness, masses, effusions, decreased range of motion, 

instability, atrophy or abnormal strength or tone in the head, neck, spine, ribs

, pelvis or extremities. No calf tenderness. 


NEUROVASCULAR: Distal pulses are present. Pedal pulse are present, equal, and 

normal. Capillary refills are < 2 seconds.


NEUROLOGIC: Alert and oriented x4. Speaks full and clear sentences. Cranial 

Nerves II-XII normal. Sensation to pain, touch, and proprioception normal. 

Grossly unremarkable. No neurologic deficits. Romberg test is negative.


PSYCHOLOGICAL: The patients mood and manner are appropriate. No hallucinations

, delusions. Not SI. Not HI. Has the capacity to decide for self


SKIN: Normal for age and ethnicity; warm; dry; good turgor; no apparent lesions 

or exudates. No rashes, hives, discoloration. Intact.


Result Diagram:  


17





Results 24 hrs





 Laboratory Tests








Test


  17


22:41 17


23:00


 


Urine Color RED  


 


Urine Clarity CLOUDY  


 


Urine pH 6.0  


 


Urine Specific Gravity 1.016  


 


Urine Ketones NEGATIVEmg/dL  


 


Urine Nitrite NEGATIVEmg/dL  


 


Urine Bilirubin NEGATIVEmg/dL  


 


Urine Urobilinogen NEGATIVEmg/dL  


 


Urine Leukocyte Esterase 1+Nina/ul  


 


Urine Microscopic RBC > 182/HPF  


 


Urine Microscopic /HPF  


 


Urine Squamous Epithelial


Cells MANY/HPF 


  


 


 


Urine Bacteria FEW/HPF  


 


Urine Mucus FEW/HPF  


 


Urine Hemoglobin 3+mg/dL  


 


Urine Glucose NEGATIVEmg/dL  


 


Urine Total Protein 2+mg/dl  


 


White Blood Count  5.910^3/ul 


 


Red Blood Count  4.2710^6/ul 


 


Hemoglobin  12.0g/dl 


 


Hematocrit  35.6% 


 


Mean Corpuscular Volume  83.4fl 


 


Mean Corpuscular Hemoglobin  28.1pg 


 


Mean Corpuscular Hemoglobin


Concent 


  33.7g/dl 


 


 


Red Cell Distribution Width  14.6% 


 


Platelet Count  13099^3/UL 


 


Mean Platelet Volume  10.4fl 


 


Neutrophils %  63.4% 


 


Lymphocytes %  22.1% 


 


Monocytes %  14.0% 


 


Eosinophils %  0.0% 


 


Basophils %  0.3% 


 


Nucleated Red Blood Cells %  0.0/100WBC 


 


Neutrophils # (Manual)  410^3/ul 


 


Lymphocytes #  1.310^3/ul 


 


Monocytes #  0.810^3/ul 


 


Eosinophils #  0.010^3/ul 


 


Basophils #  0.010^3/ul 


 


Nucleated Red Blood Cells #  0.010^3/ul 


 


Sodium Level  137mmol/L 


 


Potassium Level  2.9mmol/L 


 


Chloride Level  104mmol/L 


 


Carbon Dioxide Level  22mmol/L 


 


Anion Gap  14 


 


Blood Urea Nitrogen  6mg/dl 


 


Creatinine  0.53mg/dl 


 


Glucose Level  127mg/dl 


 


Lactic Acid Level  0.9mmol/L 


 


Calcium Level  8.4mg/dl 


 


Total Bilirubin  0.4mg/dl 


 


Direct Bilirubin  0.00mg/dl 


 


Indirect Bilirubin  0.4mg/dl 


 


Aspartate Amino Transf


(AST/SGOT) 


  26IU/L 


 


 


Alanine Aminotransferase


(ALT/SGPT) 


  38IU/L 


 


 


Alkaline Phosphatase  63IU/L 


 


Total Protein  7.1g/dl 


 


Albumin  3.9g/dl 


 


Globulin  3.20g/dl 


 


Albumin/Globulin Ratio  1.21 


 


Amylase Level  69U/L 


 


Lipase  41U/L 


 


Serum HCG, Qualitative  NEGATIVE 








 Current Medications








 Medications


  (Trade)  Dose


 Ordered  Sig/Aidee


 Route


 PRN Reason  Start Time


 Stop Time Status Last Admin


Dose Admin


 


 Sodium Chloride


  (NS)  1,000 ml @ 


 1,000 mls/hr  Q1H STAT


 IV


   17 22:31


 17 22:36 DC  


 


 


 Morphine Sulfate


  (morphine)  4 mg  ONCE  STAT


 IV


   17 22:31


 17 22:35 DC  


 


 


 Ondansetron HCl 4


 mg  4 mg  ONCE  STAT


 IV


   17 22:31


 17 22:35 DC  


 


 


 Ceftriaxone Sodium  50 ml @ 


 100 mls/hr  ONCE  STAT


 IVPB


   17 22:31


 17 23:00 DC 17 23:17


 


 


 Sodium Chloride


  (NS)  1,720 ml @ 


 1,720 mls/hr  BOLUS X1 ONCE


 IV


   17 23:00


 17 23:59 DC 17 23:17


 


 


 Acetaminophen


  (Tylenol Tab)  1,000 mg  ONCE  STAT


 PO


   17 23:12


 17 23:13 DC 17 23:17


 


 


 IV Flush 10 ml  10 ml  STK-MED ONCE


 .ROUTE


   17 00:19


 17 00:20 DC 17 00:25


 


 


 Sodium Chloride


  (NS)  100 ml @ ud  STK-MED ONCE


 .ROUTE


   17 00:19


 17 00:20 DC 17 00:25


 


 


 Potassium Chloride


  (Klor-Con 20)  40 meq  ONCE  STAT


 PO


   17 00:19


 17 00:21 DC 17 00:55


 


 


 Iohexol 150 ml  150 ml  STK-MED ONCE


 .ROUTE


   17 00:19


 17 00:20 DC 17 00:25


 


 


 Piperacillin Sod/


 Tazobactam Sod


  (Zosyn 3.375gm/


 100 ml (Pmx))  100 ml @ 


 200 mls/hr  ONCE  ONCE


 IVPB


   17 02:00


 17 02:08 DC 17 02:06


 











Procedures/MDM


Examination: Please see physical examination.


Disease process, medical treatment was explained to the patient and family 

member. They verbalized understanding and agreed with the diagnostic tests, 

medical treatment, and follow-up care.





Radiology: CT of the abdomen and pelvis with IV contrast.


Impression: Reviewed.





Blood works: Reviewed.  Hypokalemia.





Urinalysis: Reviewed.





Treatment: IV insertion.  Normal saline at 31 cc/kg.  Rocephin.  Morphine.  

Zofran.





Re-evaluation: Denies headache, dizziness, blurry vision, neck pain, shoulder 

pain, chest pain, back pain, abdominal pain, nausea, vomiting.  No episode of 

emesis in the emergency department. Alert and oriented 4.  Speaks full and 

clear sentences.  Respirations even and unlabored.  Lung sounds clear to 

auscultation.  Active bowel sounds.  There is no right upper/right lower/

epigastric/left upper/left lower abdominal tenderness and light and deep 

palpation.  Negative on Rovsings sign.  Negative Markle sign.  Able to jump 5 

times without developing right-sided abdominal pain. No peritoneal signs. 

Ambulatory with steady gait.  No neurovascular deficits.  No neurological 

deficits.  Stated that she feels much better at this time.





Consultation: Case discussed with supervising emergency room physician, Dr. Stuart Saravia, who also examined the patient and decided that we discharge her 

and to follow-up with her PCP.





Differential diagnosis: Sepsis versus nephrolithiasis versus cholecystitis 

versus pancreatitis versus diverticulitis versus diverticulosis





Medical decision makin-year-old female presents emergency department for 

pelvic pain, diarrhea, fever.  Stated that she was here last Tuesday and was 

discharged with a final diagnosis of UTI.  Complaints of abdominal pain that 

started last night.  Had a  3 months ago.  Patient's complaint, patient

's history about her complaint, my physical findings, diagnostic test results, 

my reevaluation are consistent with my final diagnosis of colitis, hypokalemia.





Medications prescribed are the following: Augmentin.  Tylenol.





Patient and family member are made aware of the side effects and adverse 

reactions of the medications prescribed. Instructed on when to seek emergent 

and medical attention in case allergic/anaphylactic reactions or severe side 

effects and or adverse reactions to medications. Patient and family member 

verbalized understanding. 





Patient instructed


Instructed to follow-up with his PCP in 24-48 hours. 


Instructed to Call 911 for chest pain, shortness of breath. Advised to come 

back here in ED as soon as possible for severity of symptoms which includes but 

not limited to: any new symptoms; shortness of breath/difficulty of breathing; 

cardiovascular changes; severe gastrointestinal symptoms; signs and symptoms of 

bleeding and or infection; signs of compartment syndrome/neurovascular changes; 

neurological changes/deficits. 


Patient and family member verbalized understanding. 





Upon discharge, patient is alert and oriented x 4, speaks full and clear 

sentences, denies pain, has no neurological deficits, has no neurovascular 

deficits, difficulty of breathing. Breathing even and unlabored. Lung sounds 

are clear to auscultation. Not in distress. Appears comfortable.  Ambulatory 

with steady gait. Appears satisfied with care provided here in ED.





Departure


Diagnosis:  


 Primary Impression:  


 Acute colitis


Condition:  Good





Additional Instructions:  


Instructed to follow-up with his PCP in 24-48 hours. 


Instructed to Call 911 for chest pain, shortness of breath. Advised to come 

back here in ED as soon as possible for severity of symptoms which includes but 

not limited to: any new symptoms; shortness of breath/difficulty of breathing; 

cardiovascular changes; severe gastrointestinal symptoms; signs and symptoms of 

bleeding and or infection; signs of compartment syndrome/neurovascular changes; 

neurological changes/deficits. 


Patient and family member verbalized understanding.











PAULA CORADO Aug 17, 2017 22:29

## 2017-08-18 NOTE — RADRPT
PROCEDURE:   XR Chest. 

 

CLINICAL INDICATION:   Chest pain.  Abdominal pain

 

TECHNIQUE:   PA and lateral views of the chest were obtained. 

 

COMPARISON:   None available 

 

FINDINGS:

 

The trachea central bronchi are patent.  The cardiomediastinal silhouette is within normal limits.  
The lungs are clear.  No pleural effusion or pneumothorax is identified.  The visualized osseous str
uctures are intact. No evidence of free air below the diaphragm.

 

RPTAT:HJJR

 

IMPRESSION:

 

Unremarkable two-view chest x-ray.

_____________________________________________ 

Physician Carmen           Date    Time 

Electronically viewed and signed by Physician Carmen on 08/18/2017 00:10 

 

D:  08/18/2017 00:10  T:  08/18/2017 00:10

JR/

## 2017-08-18 NOTE — QN
Documentation


Comment


The patient was initially seen by the PA in ED 1





The patient is 31-year-old female, presenting with fever, diarrhea, minimal 

abdominal pain for the last 3 days.  She denies cough, neck pain, chest pain, 

nausea, vomiting, dysuria.  She does not smoke or drink





Past medical history: None


Past surgical history: Appendectomy, 





 Const:      No acute distress.


 Head:        Atraumatic.


 Eyes:       Normal Conjunctiva.


 ENT:         Normal External Ears, Nose and Mouth.


 Neck:        Full range of motion.  No meningismus.


 Resp:         Clear to auscultation bilaterally.


 Cardio:       Regular rate and rhythm.


 Abd:         Soft,  non distended, normal bowel sounds, non tender.  No 

rigidity, rebound, CVA tenderness


 Skin:         No petechiae or rashes.


 Back:        No midline or flank tenderness.


 Ext:          No cyanosis, or edema.


 Neur:        Awake and alert. No focal deficit


 Psych:        Normal Mood and Affect.








 [place] [address]


 Radiology Main Line: [phone]





 DIAGNOSTIC IMAGING REPORT





 Patient: AYAN CARDENAS   : 1985   Age: 31  Sex: F            

            


 MR #:    [id number]   Acct #:   [id number]    DOS: 17 2236


 Ordering MD: PAULA CORADO NP   Location:  F[facility]   Room/Bed:                

                            


 








PROCEDURE:   XR Chest. 


 


CLINICAL INDICATION:   Chest pain.  Abdominal pain


 


TECHNIQUE:   PA and lateral views of the chest were obtained. 


 


COMPARISON:   None available 


 


FINDINGS:


 


The trachea central bronchi are patent.  The cardiomediastinal silhouette is 

within normal limits.  The lungs are clear.  No pleural effusion or 

pneumothorax is identified.  The visualized osseous structures are intact. No 

evidence of free air below the diaphragm.


 


RPTAT:HJJR


 


IMPRESSION:


 


Unremarkable two-view chest x-ray.


_____________________________________________ 


Physician Carmen           Date    Time 


Electronically viewed and signed by Physician Carmen on 2017 00:10 


 


D:  2017 00:10  T:  2017 00:10


JR/





CC: PAULA CORADO








 [facility]


 [address] [place] [address]


 Radiology Main Line: [phone]





 DIAGNOSTIC IMAGING REPORT





 Patient: AYAN CARDENAS   : 1985   Age: 31  Sex: F            

            


 MR #:    [id number]   Acct #:   [id number]    DOS: 17 2231


 Ordering MD: PAULA CORADO NP   Location:  FTE   Room/Bed:                

                            


 








PROCEDURE:   CT abdomen and pelvis with intravenous contrast. 


 


CLINICAL INDICATION: Pain.


 


TECHNIQUE:   CT of the abdomen/pelvis was performed utilizing axial images with 

reconstructions in sagittal and coronal planes after uneventful administration 

of 90 cc Omnipaque 300. The administered radiation dose is CTDI 7.3 mGy, DLP 

392 mGy-cm. 


 


COMPARISON: No pertinent prior examinations were submitted for comparison.


 


FINDINGS:


 


Visualized Chest: The visualized lung bases are clear.


 


Abdomen:


 


The liver, spleen, pancreas, gallbladder,and adrenal glands are unremarkable.   


 


The kidneys are without hydronephrosis.  No definite urinary calculi are seen.


 


There is no evidence of bowel obstruction.  Prior appendectomy is noted. No 

intra-abdominal free air is seen.  There is thickening of the colon extending 

from the proximal to mid transverse colon through the mid descending colon.  

Some minimal surrounding fat infiltrative changes are present.


 


There is no evidence of intra-abdominal adenopathy or free fluid.  


 


 


Pelvis:


 


There is no evidence of pelvic adenopathy.  The uterus and ovaries are without 

enlargement.  The urinary bladder is unremarkable.  There is a small amount of 

pelvic free fluid. The uterus likely as a septate or bicornuate configuration. 

Some minimal fatty infiltrative changes are seen within the prevesical space, 

likely due to a somewhat recent low anterior abdominal resection. No focal 

fluid collections are seen within the pelvis.


 


Osseous structures: Unremarkable.


 


IMPRESSION:


 


Long segment thickening of the transverse and left colon compatible with 

colitis.


 


Likely septate or bicornuate configuration of the uterus.


 


RPTAT: HIKT


_____________________________________________ 


.Law Oconnell MD, MD           Date    Time 


Electronically viewed and signed by .Law Oconnell MD, MD on 2017 00:37 


 


D:  2017 00:37  T:  2017 00:37


.T/





CC: PAULA CORADO





MEDICAL MAKING DECISION: The patient is a 31-year-old female, presenting with 

acute mild colitis, acute hypokalemia, contaminated urine specimen.  She was 

treated with Rocephin IV, potassium chloride 40 mg p.o., 1 L normal saline, 

morphine 4 mg IV for pain, Zofran 4 IV for nausea with good response.  She is 

stable for outpatient follow-up





The differential diagnoses considered include but are not limited to 

cholelithiasis, cholecystitis, cystitis, pancreatitis, hepatitis, gastritis, 

peptic ulcer disease, gastric ulcer, appendicitis, diverticulitis, cholangitis, 

choledocholithiasis, partial small bowel obstruction.





Diabetic impression


#1   Acute mild colitis


#2  Acute hypokalemia





Disposition: I discussed the findings with the patient. I advised the patient 

to follow-up with the primary physician in about 1-2 days, sooner if needed and 

return if any concern.  She will be discharged with Augmentin p.o.











RITA NGUYEN MD Aug 18, 2017 04:21

## 2017-08-18 NOTE — RADRPT
PROCEDURE:   CT abdomen and pelvis with intravenous contrast. 

 

CLINICAL INDICATION: Pain.

 

TECHNIQUE:   CT of the abdomen/pelvis was performed utilizing axial images with reconstructions in s
agittal and coronal planes after uneventful administration of 90 cc Omnipaque 300. The administered 
radiation dose is CTDI 7.3 mGy,  mGy-cm. 

 

COMPARISON: No pertinent prior examinations were submitted for comparison.

 

FINDINGS:

 

Visualized Chest: The visualized lung bases are clear.

 

Abdomen:

 

The liver, spleen, pancreas, gallbladder,and adrenal glands are unremarkable.   

 

The kidneys are without hydronephrosis.  No definite urinary calculi are seen.

 

There is no evidence of bowel obstruction.  Prior appendectomy is noted. No intra-abdominal free air
 is seen.  There is thickening of the colon extending from the proximal to mid transverse colon thro
ugh the mid descending colon.  Some minimal surrounding fat infiltrative changes are present.

 

There is no evidence of intra-abdominal adenopathy or free fluid.  

 

 

Pelvis:

 

There is no evidence of pelvic adenopathy.  The uterus and ovaries are without enlargement.  The uri
nary bladder is unremarkable.  There is a small amount of pelvic free fluid. The uterus likely as a 
septate or bicornuate configuration. Some minimal fatty infiltrative changes are seen within the pre
vesical space, likely due to a somewhat recent low anterior abdominal resection. No focal fluid santa
ections are seen within the pelvis.

 

Osseous structures: Unremarkable.

 

IMPRESSION:

 

Long segment thickening of the transverse and left colon compatible with colitis.

 

Likely septate or bicornuate configuration of the uterus.

 

RPTAT: HIKT

_____________________________________________ 

.Law Oconnell MD, MD           Date    Time 

Electronically viewed and signed by .Law Oconnell MD, MD on 08/18/2017 00:37 

 

D:  08/18/2017 00:37  T:  08/18/2017 00:37

.T/

## 2017-11-19 NOTE — RADRPT
PROCEDURE:  US abdomen right upper quadrant

 

CLINICAL INDICATION:  Epigastric pain. 

 

TECHNIQUE:  Gray scale and color Doppler ultrasound of the right upper quadrant of the abdomen was p
erformed.

 

COMPARISON:   CT dated 08/18/2017 and ultrasound dated 15 17.

 

FINDINGS:  

 

Pancreas:  Visualized portions are unremarkable.

 

Liver: Normal in size and echogenicity with no focal hepatic lesion. Hepatopedal flow in the main po
rtal vein.

 

Gallbladder: No cholelithiasis, gallbladder wall thickening, or pericholecystic fluid. 

 

Common bile duct: 3.5 mm in diameter.

 

Right Kidney: 10.6 cm in length. No nephrolithiasis, hydronephrosis, or mass.

 

Ascites:  None.

 

IMPRESSION:

 

1.  Unremarkable examination.  A source for the patient's symptoms is not identified.  

 

RPTAT: HLBP

_____________________________________________ 

.Luis Antonio Alicea MD, MD           Date    Time 

Electronically viewed and signed by .Luis Antonio Alicea MD, MD on 11/19/2017 17:09 

 

D:  11/19/2017 17:09  T:  11/19/2017 17:09

.P/

## 2017-11-19 NOTE — ERD
ER Documentation


Chief Complaint


Chief Complaint


EPIGASTRIC PAIN, DIARRHEA AND NAUSEA X 2 DAYS





HPI


31y/o female patient with no significant medical history, presents to the 

emergency department with family c/o gradual onset of epigastric pain, 

intermittent, that started 2 days ago. The pain is sharp burning, rated 7/10, 

without radiation. The symptoms are associated with mild nausea and diarrhea. 

Aggravating factors: Fatty food. Alleviating factors: Unknown. Denies fever, 

chills, N/V/D.  No history of previous episodes. Treatment attempted: None. 

Previous evaluation: None.





ROS


SYSTEMIC symptoms:  no fever, chills, no night sweats, no weight loss


EYE symptoms:   No blurred vision, no eye discharge


OTOLARYNGEAL symptoms:   No hearing loss. No ear pain, no sore throat


CARDIOVASCULAR symptoms:   No chest pain or discomfort, no palpitations.


PULMONARY symptoms:   No dyspnea, no cough, no wheezing.


GASTROINTESTINAL symptoms:   No abdominal pain, no nausea, no vomiting, no 

diarrhea


MUSCULOSKELETAL symptoms:   No arthralgias, no muscle aches.


NEUROLOGY symptoms: No confusion, no syncope, no numbness or tingling.


SKIN no rashes





Medications


Home Meds


Active Scripts


Cephalexin* (Keflex*) 500 Mg Capsule, 500 MG PO QID for 5 Days, CAP


   Prov:ANDRE CONTRERAS MD         17


Acetaminophen* (Tylenol*) 325 Mg Tablet, 1 TAB PO Q6 Y for PAIN AND OR ELEVATED 

TEMP, #20 TAB


   Prov:ANDRE CONTRERAS MD         17


Ranitidine Hcl* (Zantac*) 150 Mg Tablet, 150 MG PO BID Y for EPIGASTRIC PAIN, #

30 TAB


   Prov:ANDRE CONTRERAS MD         17


Acetaminophen* (Tylophen*) 500 Mg Capsule, 1 CAP PO Q6H Y for PAIN AND OR 

ELEVATED TEMP, #20 CAP


   Prov:PAULA CORADO         17


Amoxicillin/Potassium Clav (Amox-Clav 875-125 mg Tablet) 875-125 mg Tab, 1 TAB 

PO BID for 10 Days, #20 TAB


   Prov:PAULA CORADO         17


Nitrofurantoin Monohyd Macrocr* (Macrobid*) 100 Mg Capsr, 100 MG PO HS for 5 

Days, CAP


   Prov:GABE STANFORD DO         17


Loperamide Hcl* (Imodium*) 2 Mg Capsule, 2 MG PO BID Y for DIARRHEA, #10 CAP


   MAX 16 mg/day


   Prov:GABE STANFORD DO         17


Metoclopramide* (Reglan*) 10 Mg Tablet, 10 MG PO Q6 Y for NAUSEA AND/OR VOMITING

, #10 TAB


   Prov:ELAINA HERNÁNDEZ PA-C         8/15/17


Cephalexin* (Keflex*) 500 Mg Capsule, 500 MG PO TID for 7 Days, CAP


   Prov:ELAINA HERNÁNDEZ PA-C         8/15/17


Ibuprofen* (Motrin*) 600 Mg Tab, 600 MG PO Q6, #30 TAB


   Prov:ELAINA HERNÁNDEZ PA-C         8/15/17


[Oxycodone/Acetamin (5/325)] 1 TAB TAB No Conflict Check, 2 TAB PO Q4H Y for 

PAIN LEVEL 6-10, #30 0 Refills


   Prov:DALTON CLIFTON MD         5/10/17


Ibuprofen* (Ibuprofen*) 800 Mg Tablet, 800 MG PO Q8, #30 TAB 0 Refills


   Prov:DALTON CLIFTON MD         5/10/17


Metformin* (Glucophage* XR) 500 Mg Tab.sr.24h, 500 MG PO BID WITH MEALS, #120 6 

Refills


   Prov:DALTON CLIFTON MD         5/10/17


Reported Medications


Multivit/Min/Fol Ac/Iron/Pren* (Prenatal S*) 1 Tab Tab, 1 TAB PO DAILY, TAB


   17





Allergies


Allergies:  


Coded Allergies:  


     No Known Drug Allergy (Verified  Allergy, Unknown, 17)





PMhx/Soc


Medical and Surgical Hx:  pt denies Medical Hx, pt denies Surgical Hx


History of Surgery:  Yes (appendectomy (2015))


Anesthesia Reaction:  No


Hx Neurological Disorder:  No


Hx Respiratory Disorders:  No


Hx Cardiac Disorders:  No


Hx Psychiatric Problems:  No


Hx Miscellaneous Medical Probl:  No


Hx Alcohol Use:  No


Hx Substance Use:  No


Hx Tobacco Use:  No





Physical Exam


Vitals





Vital Signs








  Date Time  Temp Pulse Resp B/P Pulse Ox O2 Delivery O2 Flow Rate FiO2


 


17 15:32 98.2 90 18 127/75 98   








Physical Exam


Patient is in no acute distress, vital signs stable. Alert and fully oriented. 


EYES: PERRLA, EOMI, Sclera and conjunctiva appear normal. 


EARS: Canals clear, tympanic membranes WNL


THROAT: Normal oropharynx. 


NECK: Supple, No lymphadenopathy. Full ROM without pain or tenderness.


HEART:  RRR, no rubs, murmurs, clicks or gallops.


LUNGS: Clear to auscultation.


ABDOMEN: Soft, tenderness to palpation in epigastric area without masses or 

hepatosplenomegaly.


EXTREMITIES: No edema bilaterally.


BACK: Full ROM, no deformity, normal back exam


NEURO: Cranial nerves grossly intact, no motor or sensory deficit


Result Diagram:  


17 1658                                                                  

              17 1658





Results 24 hrs





 Laboratory Tests








Test


  17


16:58


 


White Blood Count 5.910^3/ul 


 


Red Blood Count 4.7510^6/ul 


 


Hemoglobin 13.9g/dl 


 


Hematocrit 42.3% 


 


Mean Corpuscular Volume 89.1fl 


 


Mean Corpuscular Hemoglobin 29.3pg 


 


Mean Corpuscular Hemoglobin


Concent 32.9g/dl 


 


 


Red Cell Distribution Width 12.2% 


 


Platelet Count 13710^3/UL 


 


Mean Platelet Volume 9.9fl 


 


Neutrophils % 62.1% 


 


Lymphocytes % 26.2% 


 


Monocytes % 9.0% 


 


Eosinophils % 1.7% 


 


Basophils % 0.5% 


 


Nucleated Red Blood Cells % 0.0/100WBC 


 


Neutrophils # 3.710^3/ul 


 


Lymphocytes # 1.610^3/ul 


 


Monocytes # 0.510^3/ul 


 


Eosinophils # 0.110^3/ul 


 


Basophils # 0.010^3/ul 


 


Nucleated Red Blood Cells # 0.010^3/ul 


 


Urine Color YELLOW 


 


Urine Clarity CLOUDY 


 


Urine pH 5.0 


 


Urine Specific Gravity 1.019 


 


Urine Ketones NEGATIVEmg/dL 


 


Urine Nitrite NEGATIVEmg/dL 


 


Urine Bilirubin NEGATIVEmg/dL 


 


Urine Urobilinogen NEGATIVEmg/dL 


 


Urine Leukocyte Esterase TRACELeu/ul 


 


Urine Microscopic RBC 33/HPF 


 


Urine Microscopic WBC 16/HPF 


 


Urine Squamous Epithelial


Cells MODERATE/HPF 


 


 


Urine Mucus FEW/HPF 


 


Urine Hemoglobin 3+mg/dL 


 


Urine Glucose NEGATIVEmg/dL 


 


Urine Total Protein 1+mg/dl 


 


Urine Pregnancy Test NEGATIVE 


 


Sodium Level 143mmol/L 


 


Potassium Level 3.7mmol/L 


 


Chloride Level 105mmol/L 


 


Carbon Dioxide Level 29mmol/L 


 


Anion Gap 13 


 


Blood Urea Nitrogen 12mg/dl 


 


Creatinine 0.56mg/dl 


 


Glucose Level 108mg/dl 


 


Calcium Level 9.1mg/dl 


 


Total Bilirubin 0.5mg/dl 


 


Direct Bilirubin 0.00mg/dl 


 


Indirect Bilirubin 0.5mg/dl 


 


Aspartate Amino Transf


(AST/SGOT) 32IU/L 


 


 


Alanine Aminotransferase


(ALT/SGPT) 43IU/L 


 


 


Alkaline Phosphatase 110IU/L 


 


Total Protein 7.7g/dl 


 


Albumin 4.2g/dl 


 


Globulin 3.50g/dl 


 


Albumin/Globulin Ratio 1.20 


 


Lipase 87U/L 








 Current Medications








 Medications


  (Trade)  Dose


 Ordered  Sig/Aidee


 Route


 PRN Reason  Start Time


 Stop Time Status Last Admin


Dose Admin


 


 Ranitidine HCl


  (Zantac)  300 mg  ONCE  ONCE


 PO


   17 17:00


 17 17:01 DC 17 17:09


 


 


 Acetaminophen


  (Tylenol Tab)  500 mg  ONCE  STAT


 PO


   17 16:42


 17 16:47 DC 17 16:56


 








   [place] [id number]


   Radiology Main Line: [phone]





   DIAGNOSTIC IMAGING REPORT





   Patient: AYAN CARDENAS   : 1985   Age: 32  Sex: F          

              


   MR #:    [id number]   Acct #:   [id number]    DOS: 17 1642


   Ordering MD: ANDRE CONTRERAS MD   Location:  [facility]   Room/Bed:        

                                    


   








PROCEDURE:  US abdomen right upper quadrant


 


CLINICAL INDICATION:  Epigastric pain. 


 


TECHNIQUE:  Gray scale and color Doppler ultrasound of the right upper quadrant 

of the abdomen was performed.


 


COMPARISON:   CT dated 2017 and ultrasound dated 15 17.


 


FINDINGS:  


 


Pancreas:  Visualized portions are unremarkable.


 


Liver: Normal in size and echogenicity with no focal hepatic lesion. 

Hepatopedal flow in the main portal vein.


 


Gallbladder: No cholelithiasis, gallbladder wall thickening, or pericholecystic 

fluid. 


 


Common bile duct: 3.5 mm in diameter.


 


Right Kidney: 10.6 cm in length. No nephrolithiasis, hydronephrosis, or mass.


 


Ascites:  None.


 


IMPRESSION:


 


1.  Unremarkable examination.  A source for the patient's symptoms is not 

identified.  


 


RPTAT: HLBP


_____________________________________________ 


.Luis Antonio Alicea MD, MD           Date    Time 


Electronically viewed and signed by .Luis Antonio Alicea MD, MD on 2017 17:09 


 


D:  2017 17:09  T:  2017 17:09


.P/





CC: ANDRE CONTRERAS MD





Procedures/[facility]


31y/o female patient, previously healthy, presents to the ED c/o epigastric 

abdominal pain for 2 days.  Vital signs stable, Physical exam unremarkable.  

Differential diagnosis include but not limited to: UTI, colitis, gastroenteritis

, kidney stones, irritable bowel syndrome, inflammatory bowel syndrome, 

malabsorption syndrome, cholelithiasis, food intolerance, medication side effect

, pancreatitis, diverticulitis, bowel obstruction. 


Pertinent Data: 


Labs:  CBC: normal, CMP: normal kidney and liver function, normal electrolytes. 

Lipase: normal


Radiology: Normal gallbladder ultrasound


Physical examination and clinical presentation consistent most likely with 

gastritis.


During the ED course the patient received treatment with ranitidine and 

acetaminophen presenting overall improvement of the symptoms.


Results and clinical impression discussed with the patient who agrees with 

management. The patient is stable to be treated outpatient and will be 

discharged home with a Rx for acetaminophen, ranitidine and Keflex for acute 

cystitis


Side effects of prescribed medications (headache, rash, nausea, vomiting, 

diarrhea) were reviewed.


Side effects of prescribed opiates (drowsiness, habituation) were reviewed.


Side effects of prescribed NSAID medication (GI distress, edema, bleeding, HTN) 

were reviewed.





The patient was instructed to follow up with the primary care provider in the 

next 48h.  If symptoms persist, worsen or new symptoms develop, then patient 

should return to the ED immediately.





Instructions explained and given to patient in English with acknowledgment and 

demonstrated understanding.





Disclaimer: Inadvertent spelling and grammatical errors are likely due to EHR/

dictation software use and do not reflect on the overall quality of patient 

care. Also, please note that the electronic time recorded on this note does not 

necessarily reflect the actual time of the patient encounter.





Departure


Diagnosis:  


 Primary Impression:  


 Epigastric abdominal pain


 Additional Impressions:  


 Gastritis


 Cystitis


Condition:  Stable





Additional Instructions:  


Thank you very much for allowing us to participate in your care. 


Your health and safety is our top priority at [facility].





Have prescriptions filled and follow precisely the directions on the label. 


Follow-up with primary care provider during the next 4 days and bring all the 

information and medications prescribed. 





If illness has not improved in 2 days, then make an appointment with primary 

care provider.   If the provider is unavailable, return to the Emergency 

Department immediately.











ANDRE CONTRERAS MD 2017 17:04

## 2019-09-09 ENCOUNTER — HOSPITAL ENCOUNTER (EMERGENCY)
Dept: HOSPITAL 91 - FTE | Age: 34
Discharge: HOME | End: 2019-09-09
Payer: SELF-PAY

## 2019-09-09 ENCOUNTER — HOSPITAL ENCOUNTER (EMERGENCY)
Dept: HOSPITAL 10 - FTE | Age: 34
Discharge: HOME | End: 2019-09-09
Payer: SELF-PAY

## 2019-09-09 VITALS
HEIGHT: 62 IN | BODY MASS INDEX: 24.75 KG/M2 | BODY MASS INDEX: 24.75 KG/M2 | WEIGHT: 134.48 LBS | WEIGHT: 134.48 LBS | HEIGHT: 62 IN

## 2019-09-09 VITALS — SYSTOLIC BLOOD PRESSURE: 127 MMHG | HEART RATE: 92 BPM | RESPIRATION RATE: 16 BRPM | DIASTOLIC BLOOD PRESSURE: 79 MMHG

## 2019-09-09 DIAGNOSIS — R19.7: ICD-10-CM

## 2019-09-09 DIAGNOSIS — Z79.84: ICD-10-CM

## 2019-09-09 DIAGNOSIS — R11.2: Primary | ICD-10-CM

## 2019-09-09 LAB
ADD UMIC: YES
UR ASCORBIC ACID: NEGATIVE MG/DL
UR BACTERIA: (no result) /HPF
UR BILIRUBIN (DIP): NEGATIVE MG/DL
UR BLOOD (DIP): (no result) MG/DL
UR CLARITY: (no result)
UR COLOR: (no result)
UR GLUCOSE (DIP): NEGATIVE MG/DL
UR KETONES (DIP): NEGATIVE MG/DL
UR LEUKOCYTE ESTERASE (DIP): (no result) LEU/UL
UR MUCUS: (no result) /HPF
UR NITRITE (DIP): NEGATIVE MG/DL
UR PH (DIP): 6 (ref 5–9)
UR RBC: 111 /HPF (ref 0–5)
UR SPECIFIC GRAVITY (DIP): 1.03 (ref 1–1.03)
UR SQUAMOUS EPITHELIAL CELL: (no result) /HPF
UR TOTAL PROTEIN (DIP): (no result) MG/DL
UR UROBILINOGEN (DIP): NEGATIVE MG/DL
UR WBC: 16 /HPF (ref 0–5)

## 2019-09-09 PROCEDURE — 99283 EMERGENCY DEPT VISIT LOW MDM: CPT

## 2019-09-09 PROCEDURE — 81001 URINALYSIS AUTO W/SCOPE: CPT

## 2019-09-09 RX ADMIN — ALUMINUM HYDROXIDE, MAGNESIUM HYDROXIDE, DIMETHICONE 1 ML: 200; 200; 20 SUSPENSION ORAL at 11:49
